# Patient Record
Sex: FEMALE | Race: BLACK OR AFRICAN AMERICAN | NOT HISPANIC OR LATINO
[De-identification: names, ages, dates, MRNs, and addresses within clinical notes are randomized per-mention and may not be internally consistent; named-entity substitution may affect disease eponyms.]

---

## 2020-09-28 ENCOUNTER — ASOB RESULT (OUTPATIENT)
Age: 40
End: 2020-09-28

## 2020-09-28 ENCOUNTER — APPOINTMENT (OUTPATIENT)
Dept: OBGYN | Facility: CLINIC | Age: 40
End: 2020-09-28
Payer: COMMERCIAL

## 2020-09-28 VITALS
BODY MASS INDEX: 30.22 KG/M2 | HEART RATE: 76 BPM | SYSTOLIC BLOOD PRESSURE: 121 MMHG | TEMPERATURE: 98.6 F | DIASTOLIC BLOOD PRESSURE: 84 MMHG | WEIGHT: 177 LBS | HEIGHT: 64 IN

## 2020-09-28 DIAGNOSIS — Z78.9 OTHER SPECIFIED HEALTH STATUS: ICD-10-CM

## 2020-09-28 DIAGNOSIS — Z12.4 ENCOUNTER FOR SCREENING FOR MALIGNANT NEOPLASM OF CERVIX: ICD-10-CM

## 2020-09-28 DIAGNOSIS — N89.8 OTHER SPECIFIED NONINFLAMMATORY DISORDERS OF VAGINA: ICD-10-CM

## 2020-09-28 DIAGNOSIS — Z11.3 ENCOUNTER FOR SCREENING FOR INFECTIONS WITH A PREDOMINANTLY SEXUAL MODE OF TRANSMISSION: ICD-10-CM

## 2020-09-28 DIAGNOSIS — N91.2 AMENORRHEA, UNSPECIFIED: ICD-10-CM

## 2020-09-28 DIAGNOSIS — Z87.891 PERSONAL HISTORY OF NICOTINE DEPENDENCE: ICD-10-CM

## 2020-09-28 PROBLEM — Z00.00 ENCOUNTER FOR PREVENTIVE HEALTH EXAMINATION: Status: ACTIVE | Noted: 2020-09-28

## 2020-09-28 PROCEDURE — 99203 OFFICE O/P NEW LOW 30 MIN: CPT

## 2020-09-28 PROCEDURE — 76817 TRANSVAGINAL US OBSTETRIC: CPT

## 2020-09-28 RX ORDER — VITAMIN C, CALCIUM, IRON, VITAMIN D3, VITAMIN E, THIAMIN, RIBOFLAVIN, NIACINAMIDE, VITAMIN B6, FOLIC ACID, IODINE, ZINC, COPPER, DOCUSATE SODIUM 120; 85; 30; 3; 20; 20; 1; 25; 2; 50; 159; 4.54; 150; 5; 400; 3.4 MG/1; MG/1; [IU]/1; MG/1; MG/1; MG/1; MG/1; MG/1; MG/1; MG/1; MG/1; MG/1; UG/1; MG/1; [IU]/1; MG/1
90-1 & 300 TABLET ORAL DAILY
Qty: 90 | Refills: 2 | Status: ACTIVE | COMMUNITY
Start: 2020-09-28 | End: 1900-01-01

## 2020-09-30 ENCOUNTER — EMERGENCY (EMERGENCY)
Facility: HOSPITAL | Age: 40
LOS: 1 days | Discharge: HOME | End: 2020-09-30
Attending: EMERGENCY MEDICINE | Admitting: EMERGENCY MEDICINE
Payer: COMMERCIAL

## 2020-09-30 ENCOUNTER — APPOINTMENT (OUTPATIENT)
Age: 40
End: 2020-09-30
Payer: COMMERCIAL

## 2020-09-30 ENCOUNTER — LABORATORY RESULT (OUTPATIENT)
Age: 40
End: 2020-09-30

## 2020-09-30 VITALS
TEMPERATURE: 97 F | RESPIRATION RATE: 16 BRPM | HEART RATE: 79 BPM | DIASTOLIC BLOOD PRESSURE: 77 MMHG | OXYGEN SATURATION: 100 % | SYSTOLIC BLOOD PRESSURE: 125 MMHG

## 2020-09-30 VITALS
DIASTOLIC BLOOD PRESSURE: 72 MMHG | OXYGEN SATURATION: 100 % | SYSTOLIC BLOOD PRESSURE: 116 MMHG | RESPIRATION RATE: 18 BRPM | HEART RATE: 69 BPM

## 2020-09-30 DIAGNOSIS — N93.9 ABNORMAL UTERINE AND VAGINAL BLEEDING, UNSPECIFIED: ICD-10-CM

## 2020-09-30 LAB
ALBUMIN SERPL ELPH-MCNC: 4.4 G/DL — SIGNIFICANT CHANGE UP (ref 3.3–5)
ALP SERPL-CCNC: 51 U/L — SIGNIFICANT CHANGE UP (ref 40–120)
ALT FLD-CCNC: 6 U/L — SIGNIFICANT CHANGE UP (ref 4–33)
ANION GAP SERPL CALC-SCNC: 16 MMO/L — HIGH (ref 7–14)
APPEARANCE UR: CLEAR — SIGNIFICANT CHANGE UP
AST SERPL-CCNC: 12 U/L — SIGNIFICANT CHANGE UP (ref 4–32)
BACTERIA # UR AUTO: SIGNIFICANT CHANGE UP
BASOPHILS # BLD AUTO: 0.04 K/UL — SIGNIFICANT CHANGE UP (ref 0–0.2)
BASOPHILS NFR BLD AUTO: 0.5 % — SIGNIFICANT CHANGE UP (ref 0–2)
BILIRUB SERPL-MCNC: 0.4 MG/DL — SIGNIFICANT CHANGE UP (ref 0.2–1.2)
BILIRUB UR-MCNC: NEGATIVE — SIGNIFICANT CHANGE UP
BLD GP AB SCN SERPL QL: NEGATIVE — SIGNIFICANT CHANGE UP
BLOOD UR QL VISUAL: HIGH
BUN SERPL-MCNC: 8 MG/DL — SIGNIFICANT CHANGE UP (ref 7–23)
CALCIUM SERPL-MCNC: 9.3 MG/DL — SIGNIFICANT CHANGE UP (ref 8.4–10.5)
CHLORIDE SERPL-SCNC: 104 MMOL/L — SIGNIFICANT CHANGE UP (ref 98–107)
CO2 SERPL-SCNC: 21 MMOL/L — LOW (ref 22–31)
COLOR SPEC: SIGNIFICANT CHANGE UP
CREAT SERPL-MCNC: 0.84 MG/DL — SIGNIFICANT CHANGE UP (ref 0.5–1.3)
EOSINOPHIL # BLD AUTO: 0.12 K/UL — SIGNIFICANT CHANGE UP (ref 0–0.5)
EOSINOPHIL NFR BLD AUTO: 1.6 % — SIGNIFICANT CHANGE UP (ref 0–6)
GLUCOSE SERPL-MCNC: 87 MG/DL — SIGNIFICANT CHANGE UP (ref 70–99)
GLUCOSE UR-MCNC: NEGATIVE — SIGNIFICANT CHANGE UP
HCG SERPL-ACNC: 128 MIU/ML — SIGNIFICANT CHANGE UP
HCG SERPL-MCNC: 577 MIU/ML
HCT VFR BLD CALC: 37.6 % — SIGNIFICANT CHANGE UP (ref 34.5–45)
HGB BLD-MCNC: 11.2 G/DL — LOW (ref 11.5–15.5)
HYALINE CASTS # UR AUTO: NEGATIVE — SIGNIFICANT CHANGE UP
IMM GRANULOCYTES NFR BLD AUTO: 0.1 % — SIGNIFICANT CHANGE UP (ref 0–1.5)
KETONES UR-MCNC: NEGATIVE — SIGNIFICANT CHANGE UP
LEUKOCYTE ESTERASE UR-ACNC: NEGATIVE — SIGNIFICANT CHANGE UP
LYMPHOCYTES # BLD AUTO: 1.96 K/UL — SIGNIFICANT CHANGE UP (ref 1–3.3)
LYMPHOCYTES # BLD AUTO: 25.4 % — SIGNIFICANT CHANGE UP (ref 13–44)
MCHC RBC-ENTMCNC: 25.2 PG — LOW (ref 27–34)
MCHC RBC-ENTMCNC: 29.8 % — LOW (ref 32–36)
MCV RBC AUTO: 84.5 FL — SIGNIFICANT CHANGE UP (ref 80–100)
MONOCYTES # BLD AUTO: 0.71 K/UL — SIGNIFICANT CHANGE UP (ref 0–0.9)
MONOCYTES NFR BLD AUTO: 9.2 % — SIGNIFICANT CHANGE UP (ref 2–14)
NEUTROPHILS # BLD AUTO: 4.87 K/UL — SIGNIFICANT CHANGE UP (ref 1.8–7.4)
NEUTROPHILS NFR BLD AUTO: 63.2 % — SIGNIFICANT CHANGE UP (ref 43–77)
NITRITE UR-MCNC: NEGATIVE — SIGNIFICANT CHANGE UP
NRBC # FLD: 0 K/UL — SIGNIFICANT CHANGE UP (ref 0–0)
PH UR: 6.5 — SIGNIFICANT CHANGE UP (ref 5–8)
PLATELET # BLD AUTO: 264 K/UL — SIGNIFICANT CHANGE UP (ref 150–400)
PMV BLD: 11.6 FL — SIGNIFICANT CHANGE UP (ref 7–13)
POTASSIUM SERPL-MCNC: 4.3 MMOL/L — SIGNIFICANT CHANGE UP (ref 3.5–5.3)
POTASSIUM SERPL-SCNC: 4.3 MMOL/L — SIGNIFICANT CHANGE UP (ref 3.5–5.3)
PROT SERPL-MCNC: 7.1 G/DL — SIGNIFICANT CHANGE UP (ref 6–8.3)
PROT UR-MCNC: 10 — SIGNIFICANT CHANGE UP
RBC # BLD: 4.45 M/UL — SIGNIFICANT CHANGE UP (ref 3.8–5.2)
RBC # FLD: 16.7 % — HIGH (ref 10.3–14.5)
RBC CASTS # UR COMP ASSIST: HIGH (ref 0–?)
RH IG SCN BLD-IMP: POSITIVE — SIGNIFICANT CHANGE UP
SODIUM SERPL-SCNC: 141 MMOL/L — SIGNIFICANT CHANGE UP (ref 135–145)
SP GR SPEC: 1.01 — SIGNIFICANT CHANGE UP (ref 1–1.04)
SQUAMOUS # UR AUTO: SIGNIFICANT CHANGE UP
UROBILINOGEN FLD QL: NORMAL — SIGNIFICANT CHANGE UP
WBC # BLD: 7.71 K/UL — SIGNIFICANT CHANGE UP (ref 3.8–10.5)
WBC # FLD AUTO: 7.71 K/UL — SIGNIFICANT CHANGE UP (ref 3.8–10.5)
WBC UR QL: SIGNIFICANT CHANGE UP (ref 0–?)

## 2020-09-30 PROCEDURE — 99283 EMERGENCY DEPT VISIT LOW MDM: CPT | Mod: GC

## 2020-09-30 PROCEDURE — 76830 TRANSVAGINAL US NON-OB: CPT | Mod: 26

## 2020-09-30 PROCEDURE — 99284 EMERGENCY DEPT VISIT MOD MDM: CPT

## 2020-09-30 PROCEDURE — 99213 OFFICE O/P EST LOW 20 MIN: CPT

## 2020-09-30 NOTE — ED ADULT TRIAGE NOTE - PATIENT ON (OXYGEN DELIVERY METHOD)
DIABETES HOME INSTRUCTIONS     Meal Planning Eat regularly during the day, every 4-5 hours, Do not skip meals, Include the same amount of carbohydrates in your meals, Limit snacks to 15 grams of carbohydrate. Try to be consistent as possible!     Physical Activity Increase walking by parking further from store entrance, taking the stairs rather than the elevator and increasing your steps throughout the day. Exercise is most beneficial following meals. Try to exercise for 10 minutes following each meal, prior to sitting or being inactive.     Diabetes Medication :    Humulin R U-500 Inject 160 units 30 minutes before breakfast; 120 units 30 minutes before lunch; 120 units 30 minutes before supper.    It is important to be consistent with the timing of your meals when you are on U-500 insulin.     Blood Sugar Monitoring  Before breakfast: X  Before lunch: X  Before dinner: X      Please bring your blood sugar meter, strips, and blood sugar record to all education and medical appointments. This is so important!     Your Blood Sugar Target is:   Before breakfast, lunch, dinner    2 hours after a meal      Goal Planning: The goal you selected is:     Work on the timing of your insulin. Breakfast, Lunch and Supper with U-500 should be eaten as close to the same time each day as possible given the nature of how that insulin works.    Take the insulin 30-60 minutes prior to meal. When you get to the kitchen inject, then prepare the meal and then eat and by the time you eat the insulin should be starting to work.     Consider removing Coke from your diet. Could switch to diet AW or diet 7Up.     To contact your primary doctor, please call the main clinical switchboard at [phone].      Use this same number if you have to contact your doctor on nights, weekends, or holidays.  The switchboard will page the doctor who is on-call and that doctor will call you back.  This may be your primary doctor or it may be  one of their associate doctors in the office.    It's been a pleasure helping you in caring for your diabetes!  Thank you. Please call me at [phone] if any questions or concerns.  Gustavo Mars RN CDE, [facility]            room air

## 2020-09-30 NOTE — ED ADULT TRIAGE NOTE - CHIEF COMPLAINT QUOTE
pt states she's 5 weeks pregnant with vaginal bleeding since this AM. states she's saturated 3 pads today. Denies cramping, N/V, fever, cough. Endorsing weakness.

## 2020-09-30 NOTE — ED PROVIDER NOTE - CLINICAL SUMMARY MEDICAL DECISION MAKING FREE TEXT BOX
39 y/o F w/ vaginal bleeding in first trimester. Concern for spontaneous AB. Less likely ectopic w/ previous IUP. Will check labs and ultrasound. GYN is aware. Pelvic exam deferred to GYN resident.

## 2020-09-30 NOTE — ED PROVIDER NOTE - OBJECTIVE STATEMENT
39 y/o F, , approximately 5 weeks pregnant, c/o vaginal bleeding. 2 days ago pt had some spotting, was seen by her GYN Dr. Vo, and ultrasound showed gestational sack. Pt states bleeding worsened yesterday and she went to [facility], where an ultrasound and labs were done, of which she is unsure the results for. Yesterday pt also had significant cramping and lower abdominal pain radiating to back. Today pain has improved significantly, but bleeding is now 3 pads today. Denies any lightheadedness, nausea, vomiting or urinary symptoms. Pt was seen by Dr. Vo today and a pelvic exam was done in the office, after which pt was sent to the ED for ultrasound and labs.

## 2020-09-30 NOTE — ED PROVIDER NOTE - PATIENT PORTAL LINK FT
You can access the FollowMyHealth Patient Portal offered by [facility] by registering at the following website: [email or web address]. By joining [facility]’s FollowMyHealth portal, you will also be able to view your health information using other applications (apps) compatible with our system.

## 2020-09-30 NOTE — ED PROVIDER NOTE - NSFOLLOWUPINSTRUCTIONS_ED_ALL_ED_FT
You were given Cytotec.  You may take Tylenol for pain.  Please return to ED in you have severe pain or heavy bleeding (more than 1 pad an hour).  Please follow up with Dr. Haney in 1 week, call tomorrow for appointment.  Please take copy of all labs and reports with you.

## 2020-09-30 NOTE — ED ADULT NURSE NOTE - OBJECTIVE STATEMENT
pt received in intake room 1. pt states she is 5 weeks pregnant complaining of vaginal bleeding since this morning. denies fevers, chills, chest pain, sob. labs sent. 20 gauge IV placed to left arm. will monitor.

## 2020-09-30 NOTE — CONSULT NOTE ADULT - SUBJECTIVE AND OBJECTIVE BOX
FRIEDA VÁZQUEZ  40y  Female [id number]    HPI: 39yo  LMP 8/7 p/w vaginal bleeding and cramping that started yesterday and worsened this morning. She was seen by  in the office for a concern of SAB vs Incomplete AB. Pt was sent to ED for further evaluation. Pt states she is still having vaginal bleeding but much less that previously today. She denies passing any clots now but this morning pas a large clot. At this time, pt denies fever, chills, nausea, vomiting, diarrhea, headache, constipation, dizziness, syncope, chest pain, palpitations, shortness of breath, dysuria, urgency, frequency, abdominal/pelvic pain.    Name of GYN Physician:      POB:     pLTCS - c/b infantile microcephaly   rLTCS    Pgyn: Denies fibroids, cysts, endometriosis, STI's, Abnormal pap smears     Home meds:   PNV    Allergies  No Known Allergies      PAST MEDICAL & SURGICAL HISTORY:  C/Sx2    Social History:  Denies drug use but social alcohol and tobacco use.      Vital Signs Last 24 Hrs  T(C): 36.3 (30 Sep 2020 19:22), Max: 36.3 (30 Sep 2020 19:22)  T(F): 97.3 (30 Sep 2020 19:22), Max: 97.3 (30 Sep 2020 19:22)  HR: 79 (30 Sep 2020 19:22) (79 - 79)  BP: 125/77 (30 Sep 2020 19:22) (125/77 - 125/77)  BP(mean): --  RR: 16 (30 Sep 2020 19:22) (16 - 16)  SpO2: 100% (30 Sep 2020 19:22) (100% - 100%)    Physical Exam:   General: sitting comftorably in bed, NAD   HEENT: neck supple, full ROM  CV: RR S1S2 no m/r/g  Lungs: CTA b/l, good air flow b/l   Back: No CVA tenderness  Abd: Soft, non-tender, non-distended.  Bowel sounds present.    :  Pt declined an exam as see was seen in office today.  Ext: non-tender b/l, no edema     LABS:    HCG Quantitative, Serum: 128.0                            11.2   7.71  )-----------( 264      ( 30 Sep 2020 20:08 )             37.6         141  |  104  |  8   ----------------------------<  87  4.3   |  21<L>  |  0.84    Ca    9.3      30 Sep 2020 20:08    TPro  7.1  /  Alb  4.4  /  TBili  0.4  /  DBili  x   /  AST  12  /  ALT  6   /  AlkPhos  51      Urinalysis Basic - ( 30 Sep 2020 20:08 )    Color: LIGHT YELLOW / Appearance: CLEAR / S.014 / pH: 6.5  Gluc: NEGATIVE / Ketone: NEGATIVE  / Bili: NEGATIVE / Urobili: NORMAL   Blood: LARGE / Protein: 10 / Nitrite: NEGATIVE   Leuk Esterase: NEGATIVE / RBC: 11-25 / WBC 3-5   Sq Epi: OCC / Non Sq Epi: x / Bacteria: FEW        RADIOLOGY & ADDITIONAL STUDIES:  []TVUS PENDING FRIEDA VÁZQUEZ  40y  Female [id number]    HPI: 39yo  LMP 8/7 p/w vaginal bleeding and cramping that started yesterday and worsened this morning. She was seen by  in the office for a concern of SAB vs Incomplete AB. Pt was sent to ED for further evaluation. Pt states she is still having vaginal bleeding but much less that previously today. She denies passing any clots now but this morning pas a large clot. At this time, pt denies fever, chills, nausea, vomiting, diarrhea, headache, constipation, dizziness, syncope, chest pain, palpitations, shortness of breath, dysuria, urgency, frequency, abdominal/pelvic pain.    Name of GYN Physician:      POB:     pLTCS - c/b infantile microcephaly   rLTCS    Pgyn: Denies fibroids, cysts, endometriosis, STI's, Abnormal pap smears     Home meds:   PNV    Allergies  No Known Allergies      PAST MEDICAL & SURGICAL HISTORY:  C/Sx2    Social History:  Denies drug use but social alcohol and tobacco use.      Vital Signs Last 24 Hrs  T(C): 36.3 (30 Sep 2020 19:22), Max: 36.3 (30 Sep 2020 19:22)  T(F): 97.3 (30 Sep 2020 19:22), Max: 97.3 (30 Sep 2020 19:22)  HR: 79 (30 Sep 2020 19:22) (79 - 79)  BP: 125/77 (30 Sep 2020 19:22) (125/77 - 125/77)  BP(mean): --  RR: 16 (30 Sep 2020 19:22) (16 - 16)  SpO2: 100% (30 Sep 2020 19:22) (100% - 100%)    Physical Exam:   General: sitting comftorably in bed, NAD   HEENT: neck supple, full ROM  CV: RR S1S2 no m/r/g  Lungs: CTA b/l, good air flow b/l   Back: No CVA tenderness  Abd: Soft, non-tender, non-distended.  Bowel sounds present.    :  Minimal bleeding from os. Cervix closed on speculum exam  Ext: non-tender b/l, no edema     LABS:    HCG Quantitative, Serum: 128.0                            11.2   7.71  )-----------( 264      ( 30 Sep 2020 20:08 )             37.6         141  |  104  |  8   ----------------------------<  87  4.3   |  21<L>  |  0.84    Ca    9.3      30 Sep 2020 20:08    TPro  7.1  /  Alb  4.4  /  TBili  0.4  /  DBili  x   /  AST  12  /  ALT  6   /  AlkPhos  51      Urinalysis Basic - ( 30 Sep 2020 20:08 )    Color: LIGHT YELLOW / Appearance: CLEAR / S.014 / pH: 6.5  Gluc: NEGATIVE / Ketone: NEGATIVE  / Bili: NEGATIVE / Urobili: NORMAL   Blood: LARGE / Protein: 10 / Nitrite: NEGATIVE   Leuk Esterase: NEGATIVE / RBC: 11-25 / WBC 3-5   Sq Epi: OCC / Non Sq Epi: x / Bacteria: FEW        RADIOLOGY & ADDITIONAL STUDIES:  []TVUS PENDING FRIEDA VÁZQUEZ  40y  Female [id number]    HPI: 41yo  LMP 8/7 p/w vaginal bleeding and cramping that started yesterday and worsened this morning. She was seen by  in the office for a concern of SAB vs Incomplete AB. Pt was sent to ED for further evaluation. Pt states she is still having vaginal bleeding but much less that previously today. She denies passing any clots now but this morning pas a large clot. At this time, pt denies fever, chills, nausea, vomiting, diarrhea, headache, constipation, dizziness, syncope, chest pain, palpitations, shortness of breath, dysuria, urgency, frequency, abdominal/pelvic pain.    Name of GYN Physician:      POB:     pLTCS - c/b infantile microcephaly   rLTCS    Pgyn: Denies fibroids, cysts, endometriosis, STI's, Abnormal pap smears     Home meds:   PNV    Allergies  No Known Allergies      PAST MEDICAL & SURGICAL HISTORY:  C/Sx2    Social History:  Denies drug use but social alcohol and tobacco use.      Vital Signs Last 24 Hrs  T(C): 36.3 (30 Sep 2020 19:22), Max: 36.3 (30 Sep 2020 19:22)  T(F): 97.3 (30 Sep 2020 19:22), Max: 97.3 (30 Sep 2020 19:22)  HR: 79 (30 Sep 2020 19:22) (79 - 79)  BP: 125/77 (30 Sep 2020 19:22) (125/77 - 125/77)  BP(mean): --  RR: 16 (30 Sep 2020 19:22) (16 - 16)  SpO2: 100% (30 Sep 2020 19:22) (100% - 100%)    Physical Exam:   General: sitting comftorably in bed, NAD   HEENT: neck supple, full ROM  CV: RR S1S2 no m/r/g  Lungs: CTA b/l, good air flow b/l   Back: No CVA tenderness  Abd: Soft, non-tender, non-distended.  Bowel sounds present.    :  Minimal bleeding from os. Cervix closed on speculum exam  Ext: non-tender b/l, no edema     LABS:    HCG Quantitative, Serum: 128.0                            11.2   7.71  )-----------( 264      ( 30 Sep 2020 20:08 )             37.6         141  |  104  |  8   ----------------------------<  87  4.3   |  21<L>  |  0.84    Ca    9.3      30 Sep 2020 20:08    TPro  7.1  /  Alb  4.4  /  TBili  0.4  /  DBili  x   /  AST  12  /  ALT  6   /  AlkPhos  51      Urinalysis Basic - ( 30 Sep 2020 20:08 )    Color: LIGHT YELLOW / Appearance: CLEAR / S.014 / pH: 6.5  Gluc: NEGATIVE / Ketone: NEGATIVE  / Bili: NEGATIVE / Urobili: NORMAL   Blood: LARGE / Protein: 10 / Nitrite: NEGATIVE   Leuk Esterase: NEGATIVE / RBC: 11-25 / WBC 3-5   Sq Epi: OCC / Non Sq Epi: x / Bacteria: FEW        RADIOLOGY & ADDITIONAL STUDIES:    < from: US Transvaginal (20 @ 21:51) >  EXAM:  US TRANSVAGINAL        PROCEDURE DATE:  Sep 30 2020         INTERPRETATION:  CLINICAL INFORMATION: Pregnant with heavy vaginal bleeding. Beta hCG 128    LMP: 2020    COMPARISON: None available.    TECHNIQUE:  Endovaginal and transabdominal ultrasound was performed. Color and Spectral Doppler was performed.    FINDINGS:    Uterus: 11.5 x 5.3 x 5.7 cm.  No evidence of intrauterine pregnancy.  Poorly visualized endometrial stripe measures approximately 8 mm.    Right ovary: Not visualized.  Left ovary: 2.5 x 1.2 x 1.8 cm. Within normal limits. Normal arterial and venous waveforms.    Fluid: None.    IMPRESSION:    Pregnancy of unknown location.  Differential diagnosis includes early intrauterine pregnancy, completed  and nonvisualized ectopic.  The right ovary was not visualized on this examination.  Correlation with serial beta-hCG levels and a short-term interval follow-up pelvic ultrasound is recommended in 1-3 days.    LAURA WILKERSON M.D., RADIOLOGIST RESIDENT  This document has been electronically signed.  WESLEY SEE M.D.,ATTENDING RADIOLOGIST  This document has been electronically signed. Sep 30 2020 11:53PM

## 2020-09-30 NOTE — CONSULT NOTE ADULT - ASSESSMENT
40yoF  LMP 8/7 presents with vaginal bleeding and ramping  found to have a b-HCG of 128   .  Differential includes threatened AB vs. ectopic pregnancy vs. viable IUP vs. spontanous  in progress.  Difficult to assess at this time.    - F/U TVUS   40yoF  LMP 8/7 presents with vaginal bleeding and ramping  found to have a b-HCG of 128. Office HCG on  was 577  Differential likely missed .  - F/U TVUS  - Cytotec [id number] buccally  - Pt to followup with  in one week    Seen with and examined with Dr.Yukhayev Rayray Bush PGY2

## 2020-10-02 ENCOUNTER — LABORATORY RESULT (OUTPATIENT)
Age: 40
End: 2020-10-02

## 2020-10-02 PROBLEM — Z78.9 OTHER SPECIFIED HEALTH STATUS: Chronic | Status: ACTIVE | Noted: 2020-09-30

## 2020-10-06 DIAGNOSIS — B96.89 ACUTE VAGINITIS: ICD-10-CM

## 2020-10-06 DIAGNOSIS — N76.0 ACUTE VAGINITIS: ICD-10-CM

## 2020-10-06 LAB
C TRACH RRNA SPEC QL NAA+PROBE: NOT DETECTED
CANDIDA VAG CYTO: NOT DETECTED
CYTOLOGY CVX/VAG DOC THIN PREP: ABNORMAL
G VAGINALIS+PREV SP MTYP VAG QL MICRO: DETECTED
HPV HIGH+LOW RISK DNA PNL CVX: DETECTED
N GONORRHOEA RRNA SPEC QL NAA+PROBE: NOT DETECTED
SOURCE AMPLIFICATION: NORMAL
T VAGINALIS VAG QL WET PREP: NOT DETECTED

## 2020-10-06 RX ORDER — METRONIDAZOLE 500 MG/1
500 TABLET ORAL
Qty: 14 | Refills: 0 | Status: ACTIVE | COMMUNITY
Start: 2020-10-06 | End: 1900-01-01

## 2020-10-12 ENCOUNTER — APPOINTMENT (OUTPATIENT)
Dept: OBGYN | Facility: CLINIC | Age: 40
End: 2020-10-12

## 2020-10-15 ENCOUNTER — APPOINTMENT (OUTPATIENT)
Dept: OBGYN | Facility: CLINIC | Age: 40
End: 2020-10-15
Payer: COMMERCIAL

## 2020-10-15 VITALS
SYSTOLIC BLOOD PRESSURE: 116 MMHG | WEIGHT: 181.1 LBS | TEMPERATURE: 98.6 F | HEART RATE: 85 BPM | HEIGHT: 64 IN | BODY MASS INDEX: 30.92 KG/M2 | DIASTOLIC BLOOD PRESSURE: 81 MMHG

## 2020-10-15 DIAGNOSIS — O03.9 COMPLETE OR UNSPECIFIED SPONTANEOUS ABORTION W/OUT COMPLICATION: ICD-10-CM

## 2020-10-15 PROCEDURE — 99213 OFFICE O/P EST LOW 20 MIN: CPT

## 2020-10-15 NOTE — REVIEW OF SYSTEMS
[Chills] : no chills [Fever] : no fever [Fatigue] : no fatigue [Poor Appetite] : appetite not poor [Dry Eyes] : no dry eyes [Eye Discharge] : no eye discharge [Night Sweats] : no night sweats [Dec Hearing] : no decreased hearing [Sight Problems] : no sight problems [Nasal Discharge] : no nasal discharge [Sore Throat] : no sore throat [Dyspnea] : no dyspnea [Cough] : no cough [Chest Pain] : no chest pain [SOB on Exertion] : no shortness of breath on exertion [Abdominal Pain] : no abdominal pain [Palpitations] : no palpitations [Lower Ext Edema] : no lower extremity edema [Constipation] : no constipation [Diarrhea] : diarrhea [Vomiting] : no vomiting [Melena] : no melena [Heartburn] : no heartburn [Urgency] : no urgency [Nausea] : no nausea [Bloating] : no bloating [Frequency] : no frequency [Incontinence] : no incontinence [Dysuria] : no dysuria [Urethral Discharge] : no urethral discharge [Pelvic pain] : no pelvic pain [Abn Vaginal bleeding] : no abnormal vaginal bleeding [Genital Rash/Irritation] : no genital rash/irritation [CVA Pain] : no CVA pain [Breast Lump] : no breast lump [Breast Pain] : no breast pain [Nipple Changes] : no nipple changes [Nipple Discharge] : no nipple discharge [Skin Lesion on Breast] : no skin lesion on breast [Skin Rash] : no skin rash [Arthralgias] : no arthralgias [Mole Changes] : no mole changes [Back Pain] : no back pain [Myalgias] : no myalgias

## 2020-10-17 LAB — HCG SERPL-MCNC: <1 MIU/ML

## 2020-10-20 NOTE — HISTORY OF PRESENT ILLNESS
[TextBox_4] : 41yo  LMP  2020 presents due to amenorrhea and annual exam\par Had some brown vagianl discharge but no bright red blood from the vagina\par \par  csection 6#5 M with microcephaly\par 2009 csection 8#5 M normal

## 2020-10-20 NOTE — REVIEW OF SYSTEMS
[Fever] : no fever [Fatigue] : no fatigue [Chills] : no chills [Night Sweats] : no night sweats [Poor Appetite] : appetite not poor [Dry Eyes] : no dry eyes [Sight Problems] : no sight problems [Eye Discharge] : no eye discharge [Dec Hearing] : no decreased hearing [Nasal Discharge] : no nasal discharge [Dyspnea] : no dyspnea [Cough] : no cough [SOB on Exertion] : no shortness of breath on exertion [Wheezing] : no wheezing [Chest Pain] : no chest pain [Orthopnea] : no orthopnea [Abdominal Pain] : no abdominal pain [Constipation] : no constipation [Heartburn] : no heartburn [Vomiting] : no vomiting [Melena] : no melena [Bloating] : no bloating [Nausea] : no nausea [Urgency] : no urgency [Frequency] : no frequency [Dysuria] : no dysuria [Incontinence] : no incontinence [Urethral Discharge] : no urethral discharge [Pelvic pain] : no pelvic pain [Abn Vaginal bleeding] : no abnormal vaginal bleeding [CVA Pain] : no CVA pain [Genital Rash/Irritation] : no genital rash/irritation [Breast Pain] : no breast pain [Nipple Discharge] : no nipple discharge [Breast Lump] : no breast lump [Nipple Changes] : no nipple changes [Skin Lesion on Breast] : no skin lesion on breast [Arthralgias] : no arthralgias [Skin Rash] : no skin rash [Joint Swelling] : no joint swelling [Dizziness] : no dizziness [Headache] : no headache [Back Pain] : no back pain [Convulsions] : no convulsions [Fainting] : no fainting [Depression] : no depression [Anxiety] : no anxiety [Sleep Disturbances] : no sleep disturbances [Decreased Libido] : libido not decreased [Deepening Voice] : no deepening voice [PMS/PMDD Symptoms] : no PMS/PMDD symptoms [Feeling Weak] : not feeling weak [Hot Flashes] : no hot flashes [Easy Bleeding] : no easy bleeding [Easy Bruising] : no easy bruising [Swollen Glands] : no swollen glands [Nose Bleeds] : no nose bleeds

## 2020-12-23 PROBLEM — N76.0 BACTERIAL VAGINOSIS: Status: RESOLVED | Noted: 2020-10-06 | Resolved: 2020-12-23
